# Patient Record
Sex: MALE | Race: WHITE | Employment: FULL TIME | URBAN - METROPOLITAN AREA
[De-identification: names, ages, dates, MRNs, and addresses within clinical notes are randomized per-mention and may not be internally consistent; named-entity substitution may affect disease eponyms.]

---

## 2024-05-03 ENCOUNTER — HOSPITAL ENCOUNTER (EMERGENCY)
Age: 39
Discharge: HOME | End: 2024-05-03
Payer: COMMERCIAL

## 2024-05-03 VITALS
OXYGEN SATURATION: 98 % | RESPIRATION RATE: 20 BRPM | TEMPERATURE: 97.7 F | DIASTOLIC BLOOD PRESSURE: 81 MMHG | HEART RATE: 81 BPM | BODY MASS INDEX: 33.36 KG/M2 | WEIGHT: 246 LBS | SYSTOLIC BLOOD PRESSURE: 115 MMHG

## 2024-05-03 DIAGNOSIS — T14.8XXA SKIN AVULSION: Primary | ICD-10-CM

## 2024-05-03 PROCEDURE — 6360000002 HC RX W HCPCS: Performed by: NURSE PRACTITIONER

## 2024-05-03 PROCEDURE — 99284 EMERGENCY DEPT VISIT MOD MDM: CPT

## 2024-05-03 PROCEDURE — 6370000000 HC RX 637 (ALT 250 FOR IP): Performed by: NURSE PRACTITIONER

## 2024-05-03 PROCEDURE — 90714 TD VACC NO PRESV 7 YRS+ IM: CPT | Performed by: NURSE PRACTITIONER

## 2024-05-03 PROCEDURE — 90471 IMMUNIZATION ADMIN: CPT | Performed by: NURSE PRACTITIONER

## 2024-05-03 RX ORDER — TETANUS AND DIPHTHERIA TOXOIDS ADSORBED 2; 2 [LF]/.5ML; [LF]/.5ML
0.5 INJECTION INTRAMUSCULAR ONCE
Status: COMPLETED | OUTPATIENT
Start: 2024-05-03 | End: 2024-05-03

## 2024-05-03 RX ORDER — ACETAMINOPHEN 500 MG
1000 TABLET ORAL ONCE
Status: COMPLETED | OUTPATIENT
Start: 2024-05-03 | End: 2024-05-03

## 2024-05-03 RX ADMIN — TETANUS AND DIPHTHERIA TOXOIDS ADSORBED 0.5 ML: 2; 2 INJECTION INTRAMUSCULAR at 01:34

## 2024-05-03 RX ADMIN — ACETAMINOPHEN 1000 MG: 500 TABLET ORAL at 01:34

## 2024-05-03 ASSESSMENT — LIFESTYLE VARIABLES
HOW MANY STANDARD DRINKS CONTAINING ALCOHOL DO YOU HAVE ON A TYPICAL DAY: PATIENT DOES NOT DRINK
HOW OFTEN DO YOU HAVE A DRINK CONTAINING ALCOHOL: NEVER

## 2024-05-03 NOTE — ED PROVIDER NOTES
Independent IENS Visit.      [facility] EMERGENCY DEPARTMENT  ED  Encounter Note  Admit Date/RoomTime: 5/3/2024 12:59 AM  ED Room: [place] [address]    Department of Emergency Medicine  ED Provider Note  Admit Date: 5/3/2024  Room: [place] [address]            HPI:  5/3/24, Time: 2:20 AM EDT  .       Ed Sims is a 39 y.o. old male presenting to the emergency department for a laceration to the right index finger.  He states that he cut it on a can.  There is an avulsion of skin on the volar aspect of the DIP,which occured 1 hour(s) prior to arrival. There is not a possibility of retained foreign body in the affected area.  The patients tetanus status is more than 5 years ago.   Bleeding is not controlled. There is pain at injury site. The injury was not work related.       Review of Systems:   Pertinent positives and negatives are stated within HPI, all other systems reviewed and are negative.          --------------------------------------------- PAST HISTORY ---------------------------------------------  Past Medical History:  has no past medical history on file.    Past Surgical History:  has no past surgical history on file.    Social History:  reports that he has never smoked. He has never used smokeless tobacco. He reports current alcohol use. He reports that he does not use drugs.    Family History: family history is not on file.     The patient’s home medications have been reviewed.    Allergies: Patient has no known allergies.    -------------------------------------------------- RESULTS -------------------------------------------------  All laboratory and radiology results have been personally reviewed by myself   LABS:  No results found for this visit on 05/03/24.    RADIOLOGY:  Interpreted by Radiologist.  No orders to display       ------------------------- NURSING NOTES AND VITALS REVIEWED ---------------------------   The nursing notes within the ED encounter and vital

## 2024-05-03 NOTE — DISCHARGE INSTRUCTIONS
Monitor for signs of infection such as increased redness, swelling or drainage or visual fever or any other concerns return to the emergency department    Keep area clean and dry, wash with clean running water.  Do not submerge hand in any kind of standing water such as dishwater, bath tubs, swimming pools, rivers etc.